# Patient Record
Sex: MALE | HISPANIC OR LATINO | URBAN - METROPOLITAN AREA
[De-identification: names, ages, dates, MRNs, and addresses within clinical notes are randomized per-mention and may not be internally consistent; named-entity substitution may affect disease eponyms.]

---

## 2018-12-17 ENCOUNTER — OFFICE VISIT (OUTPATIENT)
Dept: URBAN - METROPOLITAN AREA CLINIC 17 | Facility: CLINIC | Age: 58
End: 2018-12-17
Payer: MEDICARE

## 2018-12-17 DIAGNOSIS — H18.9 UNSPECIFIED DISORDER OF CORNEA: ICD-10-CM

## 2018-12-17 DIAGNOSIS — H25.811 COMBINED FORMS OF AGE-RELATED CATARACT, RIGHT EYE: ICD-10-CM

## 2018-12-17 DIAGNOSIS — E11.9 TYPE 2 DIABETES MELLITUS W/O COMPLICATION: Primary | ICD-10-CM

## 2018-12-17 PROCEDURE — 92014 COMPRE OPH EXAM EST PT 1/>: CPT | Performed by: OPTOMETRIST

## 2018-12-17 ASSESSMENT — INTRAOCULAR PRESSURE
OD: 12
OS: 9

## 2018-12-17 NOTE — IMPRESSION/PLAN
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 Plan: No treatment currently recommended. continue to monitor.

## 2018-12-17 NOTE — IMPRESSION/PLAN
Impression: Type 2 diabetes mellitus w/o complication: P43.6. No view of OS Plan: Diabetes type II: no background retinopathy, no signs of neovascularization noted. Discussed ocular and systemic benefits of blood sugar control.

## 2022-03-28 ENCOUNTER — OFFICE VISIT (OUTPATIENT)
Dept: URBAN - METROPOLITAN AREA CLINIC 17 | Facility: CLINIC | Age: 62
End: 2022-03-28
Payer: MEDICARE

## 2022-03-28 DIAGNOSIS — H04.121 DRY EYE SYNDROME OF RIGHT LACRIMAL GLAND: ICD-10-CM

## 2022-03-28 DIAGNOSIS — H25.11 AGE-RELATED NUCLEAR CATARACT OF RIGHT EYE: ICD-10-CM

## 2022-03-28 PROCEDURE — 92004 COMPRE OPH EXAM NEW PT 1/>: CPT | Performed by: OPTOMETRIST

## 2022-03-28 ASSESSMENT — INTRAOCULAR PRESSURE
OS: 23
OD: 16

## 2022-03-28 ASSESSMENT — VISUAL ACUITY: OD: 20/50

## 2022-03-28 NOTE — IMPRESSION/PLAN
Impression: Dry eye syndrome of right lacrimal gland: H04.121. Plan: Discussed diagnosis with patient. Recommend OTC artificial tears in OU for comfort, 3-4x's a day.

## 2022-03-28 NOTE — IMPRESSION/PLAN
Impression: Type 2 diabetes mellitus w/o complication: A19.1. Plan: No diabetic retinopathy today. Discussed importance of closely monitoring and controlling glucose to minimize risks of progression of disease. Patient instructed to notify clinic immediately if changes to vision are noted.

## 2022-03-28 NOTE — IMPRESSION/PLAN
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 Left. Plan: No treatment currently recommended. continue to monitor.

## 2022-03-28 NOTE — IMPRESSION/PLAN
Impression: Age-related nuclear cataract of right eye: H25.11. Plan: Cataracts account for the patient's complaints. Patient understands changing glasses will not improve vision. Recommend cataract evaluation with cataract surgeon. Briefly discussed advanced technology. Pt states he prefers to wait 1 more year before deciding to have SX on OD due to only having one eye.